# Patient Record
Sex: MALE
[De-identification: names, ages, dates, MRNs, and addresses within clinical notes are randomized per-mention and may not be internally consistent; named-entity substitution may affect disease eponyms.]

---

## 2018-11-21 ENCOUNTER — HOSPITAL ENCOUNTER (EMERGENCY)
Dept: HOSPITAL 31 - C.ER | Age: 38
Discharge: HOME | End: 2018-11-21
Payer: SELF-PAY

## 2018-11-21 VITALS — RESPIRATION RATE: 16 BRPM | TEMPERATURE: 97.3 F

## 2018-11-21 VITALS — SYSTOLIC BLOOD PRESSURE: 145 MMHG | DIASTOLIC BLOOD PRESSURE: 76 MMHG | HEART RATE: 79 BPM
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
## 2018-11-21 DIAGNOSIS — S93.401A: Primary | ICD-10-CM

## 2018-11-21 NOTE — C.PDOC
History Of Present Illness





37 yo male comes in for evaluation of Right ankle pain, swelling gradually 

developed since early today after sustained twisting injury at home. Pt sts, 

"slipped, twisted my ankle and fell down". Pt reports, gradually developed 

swelling over lateral malleolus of Right ankle, pain, that is localized and 

worse with weight bearing. Otherwise, pt denies head injury, LOC, neck pain, 

denies obvious deformity, weakness, sensory or vascular deficits to Right foot. 

AMbulate to ED.


Time Seen by Provider: 11/21/18 20:02


Chief Complaint (Nursing): Lower Extremity Problem/Injury


History Per: Patient





Past Medical History


Reviewed: Historical Data, Nursing Documentation, Vital Signs


Vital Signs: 





                                Last Vital Signs











Temp  97.3 F L  11/21/18 20:06


 


Pulse  90   11/21/18 20:06


 


Resp  16   11/21/18 20:06


 


BP  156/100 H  11/21/18 20:06


 


Pulse Ox  99   11/21/18 20:06














- Medical History


PMH: No Chronic Diseases


Family History: States: No Known Family Hx





- Social History


Hx Tobacco Use: No


Hx Alcohol Use: No


Hx Substance Use: No





- Immunization History


Hx Tetanus Toxoid Vaccination: No


Hx Influenza Vaccination: No


Hx Pneumococcal Vaccination: No





Review Of Systems


Except As Marked, All Systems Reviewed And Found Negative.


Eyes: Negative for: Vision Change


Gastrointestinal: Negative for: Nausea, Vomiting


Genitourinary: Negative for: Incontinence


Musculoskeletal: Positive for: Foot Pain (Right).  Negative for: Neck Pain, Back

Pain


Skin: Negative for: Bruising


Neurological: Negative for: Weakness, Numbness, Altered Mental Status, Headache,

Dizziness





Physical Exam





- Physical Exam


Appears: Well, Non-toxic, No Acute Distress


Skin: Normal Color, Warm, No Ecchymosis


Head: Atraumatic, Normacephalic


Eye(s): bilateral: PERRL


Extremity: Normal ROM (mild discomfort to FAROM over right ankle due to pain. No

neurovascular deficits distally to injury.), Tenderness (lateral malleolus of 

Right ankle with mild edema.), Capillary Refill (less than 2sec to Right foot), 

No Deformity, Swelling (Right ankle)


Pulses: Right Dorsalis Pedis: Normal


Neurological/Psych: Oriented x3, Normal Speech, Normal Motor, Normal Sensation, 

Normal Reflexes





ED Course And Treatment


O2 Sat by Pulse Oximetry: 99





- Other Rad


  ** Right tib/fib


X-Ray: Interpreted by Me, Viewed By Me


Interpretation: (-) acute fx





  ** Right ankle/foot


X-Ray: Interpreted by Me, Viewed By Me


Interpretation: (-) acute fx or dislocation


Progress Note: On re-eval, pt is afebrile, hemodynamicaly stable.  Non-toxic.  

head: AT/NC.  neck: Supple, (-) midline tenderness.  Right ankle: mild edema, 

tendernes sover lateral malleolus, No palpable deformity. FAROM, no 

neurovascular deficits.  Imagings review (-) acute fx or dislocation'.  Ace 

wrap/air cats applied to Right ankle. Crutches provided.  Pt has clinical 

findings c/w Right ankle sprain.  Pt advised.  ref. to f/u with Podiatry Clinic 

on Monday for re-eval.  return if any new changes.





Disposition


Counseled Patient/Family Regarding: Studies Performed, Diagnosis, Need For 

Followup, Rx Given





- Disposition


Referrals: 


[facility] [Outside]


Disposition: HOME/ ROUTINE


Disposition Time: 20:45


Condition: STABLE


Additional Instructions: 


RICE-rest, ice, compression, elevation


take pain medication as need


Follow up with Podiatry Clinic on Monday from 12 PM-3PM for re-evaluation.


return to ED if any worsening or new changes.


Prescriptions: 


traMADol [Ultram] 50 mg PO TID #7 tab


Instructions:  Ankle Sprain


Forms:  [facility] (English), Work Excuse


Print Language: Polish





- Clinical Impression


Clinical Impression: 


 Ankle sprain

## 2018-11-22 VITALS — OXYGEN SATURATION: 99 %

## 2018-11-22 NOTE — RAD
Date of service: 



11/21/2018



PROCEDURE:  Radiographs of the right tibia and fibula.



HISTORY:

injury



COMPARISON:

None available



TECHNIQUE:

Frontal and lateral views obtained.



FINDINGS:



BONES:

No fracture or destructive lesion.



JOINT SPACES:

Unremarkable.



OTHER FINDINGS:

None.



IMPRESSION:

Unremarkable radiographs of the right tibia and fibula.

## 2018-11-22 NOTE — RAD
Date of service: 



11/21/2018



PROCEDURE:  Right Foot Radiographs.



HISTORY:

 injury 



COMPARISON:

None.



FINDINGS:



BONES:

Normal. No fracture. 



JOINTS:

Normal. 



SOFT TISSUES:

Normal. 



OTHER FINDINGS:

None.



IMPRESSION:

No evidence of acute fracture or dislocation.  Mild degenerative 

changes.

## 2018-11-22 NOTE — RAD
Date of service: 



11/21/2018



PROCEDURE:  Right Ankle Radiographs.



HISTORY:

 injury 



COMPARISON:

None available.



FINDINGS:



BONES:

Normal. No fracture. 



JOINTS:

Normal. No osteoarthritis. Ankle mortise maintained. Talar dome intact



SOFT TISSUES:

Mild soft tissue swelling. 



OTHER FINDINGS:

None.



IMPRESSION:

No evidence of acute fracture or dislocation.  Mild soft tissue 

swelling.